# Patient Record
Sex: FEMALE | Race: WHITE | URBAN - METROPOLITAN AREA
[De-identification: names, ages, dates, MRNs, and addresses within clinical notes are randomized per-mention and may not be internally consistent; named-entity substitution may affect disease eponyms.]

---

## 2024-03-19 ENCOUNTER — APPOINTMENT (RX ONLY)
Dept: URBAN - METROPOLITAN AREA CLINIC 23 | Facility: CLINIC | Age: 34
Setting detail: DERMATOLOGY
End: 2024-03-19

## 2024-03-19 DIAGNOSIS — L81.4 OTHER MELANIN HYPERPIGMENTATION: ICD-10-CM

## 2024-03-19 DIAGNOSIS — D22 MELANOCYTIC NEVI: ICD-10-CM

## 2024-03-19 PROBLEM — D48.5 NEOPLASM OF UNCERTAIN BEHAVIOR OF SKIN: Status: ACTIVE | Noted: 2024-03-19

## 2024-03-19 PROBLEM — D22.72 MELANOCYTIC NEVI OF LEFT LOWER LIMB, INCLUDING HIP: Status: ACTIVE | Noted: 2024-03-19

## 2024-03-19 PROCEDURE — 11102 TANGNTL BX SKIN SINGLE LES: CPT

## 2024-03-19 PROCEDURE — ? COUNSELING

## 2024-03-19 PROCEDURE — 11103 TANGNTL BX SKIN EA SEP/ADDL: CPT

## 2024-03-19 PROCEDURE — 99202 OFFICE O/P NEW SF 15 MIN: CPT | Mod: 25

## 2024-03-19 PROCEDURE — ? BIOPSY BY SHAVE METHOD

## 2024-03-19 ASSESSMENT — LOCATION DETAILED DESCRIPTION DERM: LOCATION DETAILED: LEFT ANTERIOR PROXIMAL THIGH

## 2024-03-19 ASSESSMENT — LOCATION ZONE DERM: LOCATION ZONE: LEG

## 2024-03-19 ASSESSMENT — LOCATION SIMPLE DESCRIPTION DERM: LOCATION SIMPLE: LEFT THIGH

## 2025-03-26 ENCOUNTER — APPOINTMENT (OUTPATIENT)
Dept: URBAN - METROPOLITAN AREA CLINIC 25 | Facility: CLINIC | Age: 35
Setting detail: DERMATOLOGY
End: 2025-03-26

## 2025-03-26 DIAGNOSIS — L57.8 OTHER SKIN CHANGES DUE TO CHRONIC EXPOSURE TO NONIONIZING RADIATION: ICD-10-CM

## 2025-03-26 DIAGNOSIS — D22 MELANOCYTIC NEVI: ICD-10-CM

## 2025-03-26 DIAGNOSIS — L81.4 OTHER MELANIN HYPERPIGMENTATION: ICD-10-CM

## 2025-03-26 DIAGNOSIS — Z71.89 OTHER SPECIFIED COUNSELING: ICD-10-CM

## 2025-03-26 PROBLEM — D22.5 MELANOCYTIC NEVI OF TRUNK: Status: ACTIVE | Noted: 2025-03-26

## 2025-03-26 PROCEDURE — ? SUNSCREEN RECOMMENDATIONS

## 2025-03-26 PROCEDURE — 99213 OFFICE O/P EST LOW 20 MIN: CPT

## 2025-03-26 PROCEDURE — ? RECOMMENDATIONS

## 2025-03-26 PROCEDURE — ? COUNSELING

## 2025-03-26 ASSESSMENT — LOCATION DETAILED DESCRIPTION DERM
LOCATION DETAILED: LEFT MID-UPPER BACK
LOCATION DETAILED: RIGHT POSTERIOR SHOULDER
LOCATION DETAILED: LEFT SUPERIOR MEDIAL FOREHEAD
LOCATION DETAILED: MIDDLE STERNUM
LOCATION DETAILED: RIGHT RIB CAGE
LOCATION DETAILED: RIGHT INFERIOR CENTRAL MALAR CHEEK
LOCATION DETAILED: RIGHT INFERIOR UPPER BACK
LOCATION DETAILED: LEFT SUPERIOR UPPER BACK

## 2025-03-26 ASSESSMENT — LOCATION ZONE DERM
LOCATION ZONE: ARM
LOCATION ZONE: FACE
LOCATION ZONE: TRUNK

## 2025-03-26 ASSESSMENT — LOCATION SIMPLE DESCRIPTION DERM
LOCATION SIMPLE: RIGHT CHEEK
LOCATION SIMPLE: CHEST
LOCATION SIMPLE: LEFT UPPER BACK
LOCATION SIMPLE: RIGHT SHOULDER
LOCATION SIMPLE: LEFT FOREHEAD
LOCATION SIMPLE: RIGHT UPPER BACK
LOCATION SIMPLE: ABDOMEN

## 2025-03-26 NOTE — PROCEDURE: SUNSCREEN RECOMMENDATIONS
Products Recommended: All sunscreens sold over the counter are FDA-approved and will work well if applied as described above. The following are products that I like for various areas of the body:\\nFace:\\n- Curated Protect SPF40 sunscreen with CE Ferulic\\n- SkinCeuticals Physical Fusion UV Defense SPF 50\\n- EltaMD UV Clear Broad-Spectrum SPF 46\\n- Revision Intellishade SPF 45\\n\\nBody: \\n- EltaMD UV Spray Broad-Spectrum SPF 46\\n- EltaMD UV Pure Broad-Spectrum SPF 47\\n- EltaMD UV Sport Broad-Spectrum SPF 50\\n- Neutrogena Ultra Sheer Face & Body Sunscreen Stick SPF 70\\n- Neutrogena Sheer Zinc Broad-Spectrum SPF 50\\n\\nLips:\\n- Aquaphor Lip Protectant + Sunscreen SPF 30\\n- EltaMD UV Lip [place] Broad-Spectrum SPF 36\\n\\nScalp:\\n- Supergoop Poof Part Powder SPF 50
Detail Level: Detailed
General Sunscreen Counseling: I recommended a broad spectrum sunscreen with a SPF of 30 or higher.  I explained that SPF 30 sunscreens block approximately 97 percent of the sun's harmful rays.  Sunscreens should be applied at least 15 minutes prior to expected sun exposure and then every 2 hours after that as long as sun exposure continues. If swimming or exercising sunscreen should be reapplied every 45 minutes to an hour after getting wet or sweating.  One ounce, or the equivalent of a shot glass full of sunscreen, is adequate to protect the skin not covered by a bathing suit. I also recommended a lip balm with a sunscreen as well. Sun protective clothing can be used in lieu of sunscreen but must be worn the entire time you are exposed to the sun's rays.

## 2025-03-26 NOTE — PROCEDURE: RECOMMENDATIONS
Render Risk Assessment In Note?: no
Recommendation Preamble: The following recommendations were made during the visit: Consultation with [place]
Detail Level: Zone